# Patient Record
Sex: MALE | URBAN - METROPOLITAN AREA
[De-identification: names, ages, dates, MRNs, and addresses within clinical notes are randomized per-mention and may not be internally consistent; named-entity substitution may affect disease eponyms.]

---

## 2023-04-20 ENCOUNTER — OFFICE VISIT (OUTPATIENT)
Dept: URBAN - METROPOLITAN AREA TELEHEALTH 2 | Facility: TELEHEALTH | Age: 43
End: 2023-04-20
Payer: COMMERCIAL

## 2023-04-20 ENCOUNTER — TELEPHONE ENCOUNTER (OUTPATIENT)
Dept: URBAN - METROPOLITAN AREA CLINIC 35 | Facility: CLINIC | Age: 43
End: 2023-04-20

## 2023-04-20 DIAGNOSIS — R10.13 EPIGASTRIC PAIN: ICD-10-CM

## 2023-04-20 PROCEDURE — 99203 OFFICE O/P NEW LOW 30 MIN: CPT | Performed by: INTERNAL MEDICINE

## 2023-04-20 NOTE — HPI-TODAY'S VISIT:
Abd pain, 4-5 months, epigastric ENT did scope and saw acid reflux Gave protonix Rx. Chest x ray showed ? pneumonia on the left side. Saw pulmonary MD, all clear. CT of chest scheduled. Barium swallow done, ordered by ENT. Told it wwas normal. Went to ER. Referred to GI, Carafate added. Taking hydrocodone for back injury.

## 2023-04-21 ENCOUNTER — WEB ENCOUNTER (OUTPATIENT)
Dept: URBAN - METROPOLITAN AREA CLINIC 98 | Facility: CLINIC | Age: 43
End: 2023-04-21

## 2023-04-21 ENCOUNTER — TELEPHONE ENCOUNTER (OUTPATIENT)
Dept: URBAN - METROPOLITAN AREA CLINIC 98 | Facility: CLINIC | Age: 43
End: 2023-04-21

## 2023-04-26 ENCOUNTER — TELEPHONE ENCOUNTER (OUTPATIENT)
Dept: URBAN - METROPOLITAN AREA TELEHEALTH 2 | Facility: TELEHEALTH | Age: 43
End: 2023-04-26

## 2023-05-02 ENCOUNTER — TELEPHONE ENCOUNTER (OUTPATIENT)
Dept: URBAN - METROPOLITAN AREA CLINIC 98 | Facility: CLINIC | Age: 43
End: 2023-05-02

## 2023-05-03 ENCOUNTER — TELEPHONE ENCOUNTER (OUTPATIENT)
Dept: URBAN - METROPOLITAN AREA CLINIC 98 | Facility: CLINIC | Age: 43
End: 2023-05-03

## 2023-05-05 ENCOUNTER — OFFICE VISIT (OUTPATIENT)
Dept: URBAN - NONMETROPOLITAN AREA CLINIC 1 | Facility: CLINIC | Age: 43
End: 2023-05-05
Payer: COMMERCIAL

## 2023-05-05 DIAGNOSIS — R10.13 EPIGASTRIC PAIN: ICD-10-CM

## 2023-05-05 PROCEDURE — 76705 ECHO EXAM OF ABDOMEN: CPT | Performed by: INTERNAL MEDICINE

## 2023-05-08 ENCOUNTER — WEB ENCOUNTER (OUTPATIENT)
Dept: URBAN - METROPOLITAN AREA CLINIC 98 | Facility: CLINIC | Age: 43
End: 2023-05-08

## 2023-05-10 ENCOUNTER — OFFICE VISIT (OUTPATIENT)
Dept: URBAN - NONMETROPOLITAN AREA CLINIC 2 | Facility: CLINIC | Age: 43
End: 2023-05-10

## 2023-05-10 ENCOUNTER — TELEPHONE ENCOUNTER (OUTPATIENT)
Dept: URBAN - METROPOLITAN AREA CLINIC 98 | Facility: CLINIC | Age: 43
End: 2023-05-10

## 2023-05-11 ENCOUNTER — TELEPHONE ENCOUNTER (OUTPATIENT)
Dept: URBAN - NONMETROPOLITAN AREA CLINIC 2 | Facility: CLINIC | Age: 43
End: 2023-05-11

## 2023-05-18 ENCOUNTER — OFFICE VISIT (OUTPATIENT)
Dept: URBAN - NONMETROPOLITAN AREA SURGERY CENTER 1 | Facility: SURGERY CENTER | Age: 43
End: 2023-05-18
Payer: COMMERCIAL

## 2023-05-18 ENCOUNTER — CLAIMS CREATED FROM THE CLAIM WINDOW (OUTPATIENT)
Dept: URBAN - METROPOLITAN AREA CLINIC 4 | Facility: CLINIC | Age: 43
End: 2023-05-18
Payer: COMMERCIAL

## 2023-05-18 DIAGNOSIS — K21.9 ACID REFLUX: ICD-10-CM

## 2023-05-18 DIAGNOSIS — K31.89 ACQUIRED DEFORMITY OF DUODENUM: ICD-10-CM

## 2023-05-18 DIAGNOSIS — K21.9 GASTRO-ESOPHAGEAL REFLUX DISEASE WITHOUT ESOPHAGITIS: ICD-10-CM

## 2023-05-18 DIAGNOSIS — K31.89 OTHER DISEASES OF STOMACH AND DUODENUM: ICD-10-CM

## 2023-05-18 PROCEDURE — G8907 PT DOC NO EVENTS ON DISCHARG: HCPCS | Performed by: INTERNAL MEDICINE

## 2023-05-18 PROCEDURE — 88305 TISSUE EXAM BY PATHOLOGIST: CPT | Performed by: PATHOLOGY

## 2023-05-18 PROCEDURE — 43239 EGD BIOPSY SINGLE/MULTIPLE: CPT | Performed by: INTERNAL MEDICINE

## 2023-05-18 PROCEDURE — 88312 SPECIAL STAINS GROUP 1: CPT | Performed by: PATHOLOGY

## 2023-05-18 PROCEDURE — 88342 IMHCHEM/IMCYTCHM 1ST ANTB: CPT | Performed by: PATHOLOGY

## 2023-07-17 ENCOUNTER — WEB ENCOUNTER (OUTPATIENT)
Dept: URBAN - NONMETROPOLITAN AREA CLINIC 2 | Facility: CLINIC | Age: 43
End: 2023-07-17

## 2023-07-17 RX ORDER — SUCRALFATE 1 G/1
1 TABLET ON AN EMPTY STOMACH TABLET ORAL TWICE A DAY
Qty: 60 | OUTPATIENT
Start: 2023-07-18 | End: 2023-08-17

## 2023-07-26 ENCOUNTER — OFFICE VISIT (OUTPATIENT)
Dept: URBAN - NONMETROPOLITAN AREA CLINIC 2 | Facility: CLINIC | Age: 43
End: 2023-07-26
Payer: COMMERCIAL

## 2023-07-26 VITALS
TEMPERATURE: 98.3 F | BODY MASS INDEX: 25.84 KG/M2 | HEIGHT: 73 IN | SYSTOLIC BLOOD PRESSURE: 114 MMHG | DIASTOLIC BLOOD PRESSURE: 66 MMHG | WEIGHT: 195 LBS | HEART RATE: 63 BPM

## 2023-07-26 DIAGNOSIS — R10.13 EPIGASTRIC PAIN: ICD-10-CM

## 2023-07-26 PROCEDURE — 99214 OFFICE O/P EST MOD 30 MIN: CPT | Performed by: NURSE PRACTITIONER

## 2023-07-26 RX ORDER — SUCRALFATE 1 G/1
1 TABLET ON AN EMPTY STOMACH TABLET ORAL TWICE A DAY
Qty: 60 | Status: ACTIVE | COMMUNITY
Start: 2023-07-18 | End: 2023-08-17

## 2023-07-26 RX ORDER — OMEPRAZOLE 40 MG/1
1 CAPSULE 30 MINUTES BEFORE MORNING MEAL CAPSULE, DELAYED RELEASE ORAL ONCE A DAY
Qty: 90 CAPSULE | Refills: 3 | OUTPATIENT
Start: 2023-07-26

## 2023-07-26 RX ORDER — FAMOTIDINE 40 MG/1
1 TABLET AT BEDTIME TABLET, FILM COATED ORAL ONCE A DAY
Qty: 30 | Refills: 11 | OUTPATIENT
Start: 2023-07-26
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
## 2023-07-27 ENCOUNTER — WEB ENCOUNTER (OUTPATIENT)
Dept: URBAN - NONMETROPOLITAN AREA CLINIC 2 | Facility: CLINIC | Age: 43
End: 2023-07-27

## 2023-07-31 ENCOUNTER — WEB ENCOUNTER (OUTPATIENT)
Dept: URBAN - NONMETROPOLITAN AREA CLINIC 2 | Facility: CLINIC | Age: 43
End: 2023-07-31

## 2023-08-22 ENCOUNTER — WEB ENCOUNTER (OUTPATIENT)
Dept: URBAN - NONMETROPOLITAN AREA CLINIC 2 | Facility: CLINIC | Age: 43
End: 2023-08-22

## 2023-08-23 ENCOUNTER — WEB ENCOUNTER (OUTPATIENT)
Dept: URBAN - NONMETROPOLITAN AREA CLINIC 2 | Facility: CLINIC | Age: 43
End: 2023-08-23

## 2023-10-09 ENCOUNTER — WEB ENCOUNTER (OUTPATIENT)
Dept: URBAN - NONMETROPOLITAN AREA CLINIC 2 | Facility: CLINIC | Age: 43
End: 2023-10-09

## 2023-10-25 ENCOUNTER — OFFICE VISIT (OUTPATIENT)
Dept: URBAN - NONMETROPOLITAN AREA CLINIC 2 | Facility: CLINIC | Age: 43
End: 2023-10-25

## 2023-11-03 ENCOUNTER — OFFICE VISIT (OUTPATIENT)
Dept: URBAN - NONMETROPOLITAN AREA CLINIC 2 | Facility: CLINIC | Age: 43
End: 2023-11-03
Payer: COMMERCIAL

## 2023-11-03 VITALS
SYSTOLIC BLOOD PRESSURE: 132 MMHG | BODY MASS INDEX: 25.98 KG/M2 | TEMPERATURE: 98 F | HEIGHT: 73 IN | HEART RATE: 65 BPM | DIASTOLIC BLOOD PRESSURE: 83 MMHG | WEIGHT: 196 LBS

## 2023-11-03 DIAGNOSIS — R10.13 EPIGASTRIC PAIN: ICD-10-CM

## 2023-11-03 DIAGNOSIS — R19.5 LOOSE STOOLS: ICD-10-CM

## 2023-11-03 PROCEDURE — 99214 OFFICE O/P EST MOD 30 MIN: CPT | Performed by: NURSE PRACTITIONER

## 2023-11-03 RX ORDER — OMEPRAZOLE 40 MG/1
1 CAPSULE 30 MINUTES BEFORE MORNING MEAL CAPSULE, DELAYED RELEASE ORAL ONCE A DAY
Qty: 90 CAPSULE | Refills: 3 | Status: ACTIVE | COMMUNITY
Start: 2023-07-26

## 2023-11-03 RX ORDER — FAMOTIDINE 40 MG/1
1 TABLET AT BEDTIME TABLET, FILM COATED ORAL ONCE A DAY
Qty: 30 | Refills: 11 | OUTPATIENT

## 2023-11-03 RX ORDER — METRONIDAZOLE 250 MG/1
1 CAPSULES TABLET, FILM COATED ORAL
Qty: 28 | Refills: 0 | OUTPATIENT
Start: 2023-11-03 | End: 2023-11-16

## 2023-11-03 RX ORDER — OMEPRAZOLE 40 MG/1
1 CAPSULE 30 MINUTES BEFORE MORNING MEAL CAPSULE, DELAYED RELEASE ORAL ONCE A DAY
Qty: 90 CAPSULE | Refills: 3 | OUTPATIENT

## 2023-11-03 RX ORDER — FAMOTIDINE 40 MG/1
1 TABLET AT BEDTIME TABLET, FILM COATED ORAL ONCE A DAY
Qty: 30 | Refills: 11 | Status: ACTIVE | COMMUNITY
Start: 2023-07-26
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
## 2023-11-08 ENCOUNTER — WEB ENCOUNTER (OUTPATIENT)
Dept: URBAN - NONMETROPOLITAN AREA CLINIC 2 | Facility: CLINIC | Age: 43
End: 2023-11-08

## 2023-11-08 PROBLEM — 197125005: Status: ACTIVE | Noted: 2023-11-08

## 2023-11-08 RX ORDER — RIFAXIMIN 550 MG/1
1 TABLET TABLET ORAL THREE TIMES A DAY
Qty: 42 TABLET | Refills: 0 | OUTPATIENT
Start: 2023-11-08 | End: 2023-11-22

## 2023-11-09 ENCOUNTER — WEB ENCOUNTER (OUTPATIENT)
Dept: URBAN - NONMETROPOLITAN AREA CLINIC 2 | Facility: CLINIC | Age: 43
End: 2023-11-09

## 2023-11-09 ENCOUNTER — TELEPHONE ENCOUNTER (OUTPATIENT)
Dept: URBAN - NONMETROPOLITAN AREA CLINIC 2 | Facility: CLINIC | Age: 43
End: 2023-11-09

## 2023-11-13 ENCOUNTER — WEB ENCOUNTER (OUTPATIENT)
Dept: URBAN - NONMETROPOLITAN AREA CLINIC 2 | Facility: CLINIC | Age: 43
End: 2023-11-13

## 2024-02-02 ENCOUNTER — OV EP (OUTPATIENT)
Dept: URBAN - NONMETROPOLITAN AREA CLINIC 2 | Facility: CLINIC | Age: 44
End: 2024-02-02

## 2024-02-09 ENCOUNTER — OV EP (OUTPATIENT)
Dept: URBAN - NONMETROPOLITAN AREA CLINIC 2 | Facility: CLINIC | Age: 44
End: 2024-02-09
Payer: COMMERCIAL

## 2024-02-09 VITALS
HEIGHT: 73 IN | BODY MASS INDEX: 26.24 KG/M2 | SYSTOLIC BLOOD PRESSURE: 128 MMHG | HEART RATE: 59 BPM | WEIGHT: 198 LBS | DIASTOLIC BLOOD PRESSURE: 81 MMHG

## 2024-02-09 DIAGNOSIS — R10.13 EPIGASTRIC PAIN: ICD-10-CM

## 2024-02-09 DIAGNOSIS — R19.5 LOOSE STOOLS: ICD-10-CM

## 2024-02-09 PROCEDURE — 99214 OFFICE O/P EST MOD 30 MIN: CPT | Performed by: INTERNAL MEDICINE

## 2024-02-09 RX ORDER — OMEPRAZOLE 40 MG/1
1 CAPSULE 30 MINUTES BEFORE MORNING MEAL CAPSULE, DELAYED RELEASE ORAL ONCE A DAY
Qty: 90 CAPSULE | Refills: 3 | Status: ACTIVE | COMMUNITY

## 2024-02-09 RX ORDER — AMOXICILLIN AND CLAVULANATE POTASSIUM 500; 125 MG/1; 1/1
1 TABLET TABLET, FILM COATED ORAL
Qty: 28 TABLET | Refills: 1 | OUTPATIENT
Start: 2024-02-09 | End: 2024-03-08

## 2024-02-09 RX ORDER — FAMOTIDINE 40 MG/1
1 TABLET AT BEDTIME TABLET, FILM COATED ORAL ONCE A DAY
Qty: 30 | Refills: 11 | Status: ACTIVE | COMMUNITY
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
## 2024-05-17 ENCOUNTER — OFFICE VISIT (OUTPATIENT)
Dept: URBAN - NONMETROPOLITAN AREA CLINIC 2 | Facility: CLINIC | Age: 44
End: 2024-05-17

## 2024-05-28 ENCOUNTER — APPOINTMENT (OUTPATIENT)
Dept: URBAN - NONMETROPOLITAN AREA CLINIC 45 | Age: 44
Setting detail: DERMATOLOGY
End: 2024-05-28

## 2024-05-28 DIAGNOSIS — L82.1 OTHER SEBORRHEIC KERATOSIS: ICD-10-CM

## 2024-05-28 DIAGNOSIS — L72.8 OTHER FOLLICULAR CYSTS OF THE SKIN AND SUBCUTANEOUS TISSUE: ICD-10-CM

## 2024-05-28 PROCEDURE — OTHER TREATMENT REGIMEN: OTHER

## 2024-05-28 PROCEDURE — 99203 OFFICE O/P NEW LOW 30 MIN: CPT

## 2024-05-28 PROCEDURE — OTHER COUNSELING: OTHER

## 2024-05-28 ASSESSMENT — LOCATION SIMPLE DESCRIPTION DERM
LOCATION SIMPLE: RIGHT UPPER BACK
LOCATION SIMPLE: LEFT CHEEK
LOCATION SIMPLE: LEFT UPPER BACK

## 2024-05-28 ASSESSMENT — LOCATION ZONE DERM
LOCATION ZONE: FACE
LOCATION ZONE: TRUNK

## 2024-05-28 ASSESSMENT — LOCATION DETAILED DESCRIPTION DERM
LOCATION DETAILED: RIGHT SUPERIOR UPPER BACK
LOCATION DETAILED: LEFT CENTRAL MALAR CHEEK
LOCATION DETAILED: LEFT LATERAL UPPER BACK

## 2025-03-19 ENCOUNTER — TELEPHONE ENCOUNTER (OUTPATIENT)
Dept: URBAN - NONMETROPOLITAN AREA CLINIC 13 | Facility: CLINIC | Age: 45
End: 2025-03-19